# Patient Record
Sex: FEMALE | Race: BLACK OR AFRICAN AMERICAN | Employment: FULL TIME | URBAN - METROPOLITAN AREA
[De-identification: names, ages, dates, MRNs, and addresses within clinical notes are randomized per-mention and may not be internally consistent; named-entity substitution may affect disease eponyms.]

---

## 2022-03-19 PROBLEM — Z34.90 TERM PREGNANCY: Status: ACTIVE | Noted: 2021-08-10

## 2023-01-31 RX ORDER — FERROUS SULFATE 325(65) MG
TABLET ORAL
COMMUNITY

## 2024-07-04 PROBLEM — Z37.9 NORMAL LABOR: Status: ACTIVE | Noted: 2024-07-04

## 2025-07-15 ENCOUNTER — TELEPHONE (OUTPATIENT)
Age: 30
End: 2025-07-15

## 2025-07-15 NOTE — TELEPHONE ENCOUNTER
----- Message from Marla CROSS sent at 7/7/2025  9:22 AM EDT -----  Regarding: Specialty Escalation To Practice  Specialty Escalation To Practice      Type of Escalation: Escalation Options: Urgent Appointment  --------------------------------------------------------------------------------------------------------------------------    Information for Provider:  Patient is looking for appointment for: Symptom Break to left Elbow and left Knee    Reasons for Message: Unable to reach practice    Additional Information: Patient was seen at [facility] on 7/5/25 because patient states that she fell on 7/5/25 and after X-rays and CT scan the patient states that she was told she has a break in her left elbow and left knee. No prior surgery. Patient states that [facility] would not accept her [facility] health plan.  --------------------------------------------------------------------------------------------------------------------------    Relationship to Patient: Self    Call Back Info: OK to leave message on voicemail  Preferred Call Back Number:  [phone]

## 2025-08-06 ENCOUNTER — HOSPITAL ENCOUNTER (OUTPATIENT)
Facility: HOSPITAL | Age: 30
Setting detail: RECURRING SERIES
Discharge: HOME | End: 2025-08-09
Payer: COMMERCIAL

## 2025-08-06 PROCEDURE — 97535 SELF CARE MNGMENT TRAINING: CPT

## 2025-08-06 PROCEDURE — 97162 PT EVAL MOD COMPLEX 30 MIN: CPT

## 2025-08-06 PROCEDURE — 97165 OT EVAL LOW COMPLEX 30 MIN: CPT

## 2025-08-07 ENCOUNTER — APPOINTMENT (OUTPATIENT)
Facility: HOSPITAL | Age: 30
End: 2025-08-07
Payer: COMMERCIAL

## 2025-08-08 ENCOUNTER — HOSPITAL ENCOUNTER (OUTPATIENT)
Facility: HOSPITAL | Age: 30
Setting detail: RECURRING SERIES
Discharge: HOME | End: 2025-08-11
Payer: COMMERCIAL

## 2025-08-08 PROCEDURE — 97112 NEUROMUSCULAR REEDUCATION: CPT

## 2025-08-08 PROCEDURE — 97530 THERAPEUTIC ACTIVITIES: CPT

## 2025-08-08 PROCEDURE — 97535 SELF CARE MNGMENT TRAINING: CPT

## 2025-08-08 PROCEDURE — 97110 THERAPEUTIC EXERCISES: CPT

## 2025-08-11 ENCOUNTER — HOSPITAL ENCOUNTER (OUTPATIENT)
Facility: HOSPITAL | Age: 30
Setting detail: RECURRING SERIES
Discharge: HOME | End: 2025-08-14
Payer: COMMERCIAL

## 2025-08-11 PROCEDURE — 97530 THERAPEUTIC ACTIVITIES: CPT

## 2025-08-11 PROCEDURE — 97032 APPL MODALITY 1+ESTIM EA 15: CPT

## 2025-08-11 PROCEDURE — 97535 SELF CARE MNGMENT TRAINING: CPT

## 2025-08-11 PROCEDURE — 97112 NEUROMUSCULAR REEDUCATION: CPT

## 2025-08-11 PROCEDURE — 97110 THERAPEUTIC EXERCISES: CPT

## 2025-08-12 ENCOUNTER — HOSPITAL ENCOUNTER (OUTPATIENT)
Facility: HOSPITAL | Age: 30
Setting detail: RECURRING SERIES
Discharge: HOME | End: 2025-08-15
Payer: COMMERCIAL

## 2025-08-12 PROCEDURE — 97110 THERAPEUTIC EXERCISES: CPT

## 2025-08-12 PROCEDURE — 97530 THERAPEUTIC ACTIVITIES: CPT

## 2025-08-12 PROCEDURE — 97032 APPL MODALITY 1+ESTIM EA 15: CPT

## 2025-08-12 PROCEDURE — 97140 MANUAL THERAPY 1/> REGIONS: CPT

## 2025-08-12 PROCEDURE — 97112 NEUROMUSCULAR REEDUCATION: CPT

## 2025-08-18 ENCOUNTER — APPOINTMENT (OUTPATIENT)
Facility: HOSPITAL | Age: 30
End: 2025-08-18
Payer: COMMERCIAL

## 2025-08-20 ENCOUNTER — APPOINTMENT (OUTPATIENT)
Facility: HOSPITAL | Age: 30
End: 2025-08-20
Payer: COMMERCIAL

## 2025-08-22 ENCOUNTER — HOSPITAL ENCOUNTER (OUTPATIENT)
Facility: HOSPITAL | Age: 30
Setting detail: RECURRING SERIES
Discharge: HOME | End: 2025-08-25
Payer: COMMERCIAL

## 2025-08-22 PROCEDURE — 97535 SELF CARE MNGMENT TRAINING: CPT

## 2025-08-22 PROCEDURE — 97110 THERAPEUTIC EXERCISES: CPT

## 2025-08-22 PROCEDURE — 97032 APPL MODALITY 1+ESTIM EA 15: CPT

## 2025-08-22 PROCEDURE — 97530 THERAPEUTIC ACTIVITIES: CPT

## 2025-08-26 ENCOUNTER — HOSPITAL ENCOUNTER (OUTPATIENT)
Facility: HOSPITAL | Age: 30
Setting detail: RECURRING SERIES
Discharge: HOME | End: 2025-08-29
Payer: COMMERCIAL

## 2025-08-26 PROCEDURE — G0283 ELEC STIM OTHER THAN WOUND: HCPCS

## 2025-08-26 PROCEDURE — 97110 THERAPEUTIC EXERCISES: CPT

## 2025-08-26 PROCEDURE — 97530 THERAPEUTIC ACTIVITIES: CPT

## 2025-08-26 PROCEDURE — 97116 GAIT TRAINING THERAPY: CPT

## 2025-09-02 ENCOUNTER — HOSPITAL ENCOUNTER (OUTPATIENT)
Facility: HOSPITAL | Age: 30
Setting detail: RECURRING SERIES
Discharge: HOME | End: 2025-09-05
Payer: COMMERCIAL

## 2025-09-02 PROCEDURE — 97116 GAIT TRAINING THERAPY: CPT

## 2025-09-02 PROCEDURE — 97110 THERAPEUTIC EXERCISES: CPT

## 2025-09-02 PROCEDURE — 97530 THERAPEUTIC ACTIVITIES: CPT

## 2025-09-02 PROCEDURE — G0283 ELEC STIM OTHER THAN WOUND: HCPCS

## 2025-09-02 PROCEDURE — 97140 MANUAL THERAPY 1/> REGIONS: CPT
